# Patient Record
Sex: FEMALE | Race: WHITE | URBAN - METROPOLITAN AREA
[De-identification: names, ages, dates, MRNs, and addresses within clinical notes are randomized per-mention and may not be internally consistent; named-entity substitution may affect disease eponyms.]

---

## 2024-05-08 ENCOUNTER — TRANSCRIBE ORDERS (OUTPATIENT)
Dept: ADMINISTRATIVE | Facility: HOSPITAL | Age: 84
End: 2024-05-08
Payer: MEDICARE

## 2024-05-08 DIAGNOSIS — R60.0 LOWER EXTREMITY EDEMA: Primary | ICD-10-CM

## 2024-06-05 ENCOUNTER — HOSPITAL ENCOUNTER (OUTPATIENT)
Dept: CARDIOLOGY | Facility: HOSPITAL | Age: 84
Discharge: HOME | End: 2024-06-05
Payer: MEDICARE

## 2024-06-05 VITALS
SYSTOLIC BLOOD PRESSURE: 127 MMHG | DIASTOLIC BLOOD PRESSURE: 80 MMHG | HEIGHT: 67 IN | WEIGHT: 170 LBS | BODY MASS INDEX: 26.68 KG/M2

## 2024-06-05 DIAGNOSIS — R60.0 LOWER EXTREMITY EDEMA: ICD-10-CM

## 2024-06-05 PROCEDURE — 93306 TTE W/DOPPLER COMPLETE: CPT

## 2024-06-05 PROCEDURE — 93356 MYOCRD STRAIN IMG SPCKL TRCK: CPT

## 2024-06-06 LAB
AORTIC DIMENSIONLESS INDEX: 0.85 (DI)
BH CV ECHO LEFT VENTRICLE GLOBAL LONGITUDINAL STRAIN: -16 %
BH CV ECHO MEAS - ACS: 1.9 CM
BH CV ECHO MEAS - AO MAX PG: 6.7 MMHG
BH CV ECHO MEAS - AO MEAN PG: 4 MMHG
BH CV ECHO MEAS - AO V2 MAX: 129 CM/SEC
BH CV ECHO MEAS - AO V2 VTI: 27.6 CM
BH CV ECHO MEAS - AVA(I,D): 2.7 CM2
BH CV ECHO MEAS - EDV(CUBED): 50.7 ML
BH CV ECHO MEAS - EDV(MOD-SP4): 50.9 ML
BH CV ECHO MEAS - EF(MOD-BP): 65 %
BH CV ECHO MEAS - EF(MOD-SP4): 64.8 %
BH CV ECHO MEAS - ESV(CUBED): 13.8 ML
BH CV ECHO MEAS - ESV(MOD-SP4): 17.9 ML
BH CV ECHO MEAS - FS: 35.1 %
BH CV ECHO MEAS - IVS/LVPW: 1.11 CM
BH CV ECHO MEAS - IVSD: 1 CM
BH CV ECHO MEAS - LA DIMENSION: 3.3 CM
BH CV ECHO MEAS - LAT PEAK E' VEL: 9.5 CM/SEC
BH CV ECHO MEAS - LV DIASTOLIC VOL/BSA (35-75): 27 CM2
BH CV ECHO MEAS - LV MASS(C)D: 104.6 GRAMS
BH CV ECHO MEAS - LV MAX PG: 4.7 MMHG
BH CV ECHO MEAS - LV MEAN PG: 3 MMHG
BH CV ECHO MEAS - LV SYSTOLIC VOL/BSA (12-30): 9.5 CM2
BH CV ECHO MEAS - LV V1 MAX: 108 CM/SEC
BH CV ECHO MEAS - LV V1 VTI: 24 CM
BH CV ECHO MEAS - LVIDD: 3.7 CM
BH CV ECHO MEAS - LVIDS: 2.4 CM
BH CV ECHO MEAS - LVOT AREA: 3.1 CM2
BH CV ECHO MEAS - LVOT DIAM: 2 CM
BH CV ECHO MEAS - LVPWD: 0.9 CM
BH CV ECHO MEAS - MED PEAK E' VEL: 6.1 CM/SEC
BH CV ECHO MEAS - MR MAX PG: 96.8 MMHG
BH CV ECHO MEAS - MR MAX VEL: 492 CM/SEC
BH CV ECHO MEAS - MV A MAX VEL: 90.2 CM/SEC
BH CV ECHO MEAS - MV DEC SLOPE: 401 CM/SEC2
BH CV ECHO MEAS - MV DEC TIME: 0.21 SEC
BH CV ECHO MEAS - MV E MAX VEL: 105 CM/SEC
BH CV ECHO MEAS - MV E/A: 1.16
BH CV ECHO MEAS - MV MAX PG: 3.9 MMHG
BH CV ECHO MEAS - MV MEAN PG: 2 MMHG
BH CV ECHO MEAS - MV P1/2T: 72.7 MSEC
BH CV ECHO MEAS - MV V2 VTI: 28.9 CM
BH CV ECHO MEAS - MVA(P1/2T): 3 CM2
BH CV ECHO MEAS - MVA(VTI): 2.6 CM2
BH CV ECHO MEAS - PA ACC TIME: 0.14 SEC
BH CV ECHO MEAS - PA V2 MAX: 78.4 CM/SEC
BH CV ECHO MEAS - PI END-D VEL: 120 CM/SEC
BH CV ECHO MEAS - PULM A REVS DUR: 0.11 SEC
BH CV ECHO MEAS - PULM A REVS VEL: 30.2 CM/SEC
BH CV ECHO MEAS - PULM DIAS VEL: 32.1 CM/SEC
BH CV ECHO MEAS - PULM S/D: 1.68
BH CV ECHO MEAS - PULM SYS VEL: 53.9 CM/SEC
BH CV ECHO MEAS - RV MAX PG: 1.47 MMHG
BH CV ECHO MEAS - RV V1 MAX: 60.7 CM/SEC
BH CV ECHO MEAS - RV V1 VTI: 11.6 CM
BH CV ECHO MEAS - RVDD: 3.3 CM
BH CV ECHO MEAS - SV(LVOT): 75.4 ML
BH CV ECHO MEAS - SV(MOD-SP4): 33 ML
BH CV ECHO MEAS - SVI(LVOT): 40 ML/M2
BH CV ECHO MEAS - SVI(MOD-SP4): 17.5 ML/M2
BH CV ECHO MEAS - TAPSE (>1.6): 1.83 CM
BH CV ECHO MEAS - TR MAX PG: 31.8 MMHG
BH CV ECHO MEAS - TR MAX VEL: 282 CM/SEC
BH CV ECHO MEASUREMENTS AVERAGE E/E' RATIO: 13.46
BH CV XLRA - TDI S': 11.1 CM/SEC
LEFT ATRIUM VOLUME INDEX: 15.1 ML/M2
SINUS: 3.1 CM

## 2024-08-23 ENCOUNTER — OFFICE VISIT (OUTPATIENT)
Age: 84
End: 2024-08-23
Payer: MEDICARE

## 2024-08-23 VITALS
DIASTOLIC BLOOD PRESSURE: 80 MMHG | HEIGHT: 67 IN | WEIGHT: 177 LBS | HEART RATE: 81 BPM | OXYGEN SATURATION: 97 % | SYSTOLIC BLOOD PRESSURE: 132 MMHG | BODY MASS INDEX: 27.78 KG/M2

## 2024-08-23 DIAGNOSIS — R06.09 DOE (DYSPNEA ON EXERTION): ICD-10-CM

## 2024-08-23 DIAGNOSIS — I34.0 MITRAL VALVE INSUFFICIENCY, UNSPECIFIED ETIOLOGY: Primary | ICD-10-CM

## 2024-08-23 PROCEDURE — 93000 ELECTROCARDIOGRAM COMPLETE: CPT | Performed by: STUDENT IN AN ORGANIZED HEALTH CARE EDUCATION/TRAINING PROGRAM

## 2024-08-23 PROCEDURE — 99204 OFFICE O/P NEW MOD 45 MIN: CPT | Performed by: STUDENT IN AN ORGANIZED HEALTH CARE EDUCATION/TRAINING PROGRAM

## 2024-08-23 PROCEDURE — 1160F RVW MEDS BY RX/DR IN RCRD: CPT | Performed by: STUDENT IN AN ORGANIZED HEALTH CARE EDUCATION/TRAINING PROGRAM

## 2024-08-23 PROCEDURE — 1159F MED LIST DOCD IN RCRD: CPT | Performed by: STUDENT IN AN ORGANIZED HEALTH CARE EDUCATION/TRAINING PROGRAM

## 2024-08-23 RX ORDER — LEVOTHYROXINE SODIUM 0.07 MG/1
75 TABLET ORAL DAILY
COMMUNITY

## 2024-08-23 RX ORDER — LORATADINE 10 MG/1
CAPSULE, LIQUID FILLED ORAL
COMMUNITY

## 2024-08-23 RX ORDER — MELOXICAM 15 MG/1
15 TABLET ORAL DAILY
COMMUNITY

## 2024-08-23 RX ORDER — BETAMETHASONE DIPROPIONATE 0.5 MG/G
CREAM TOPICAL
COMMUNITY
Start: 2024-07-23

## 2024-08-23 RX ORDER — OMEPRAZOLE 20 MG/1
20 CAPSULE, DELAYED RELEASE ORAL DAILY
COMMUNITY
Start: 2024-05-07

## 2024-08-23 RX ORDER — ASPIRIN 81 MG/1
1 TABLET ORAL DAILY
COMMUNITY

## 2024-08-23 RX ORDER — PAROXETINE HCL 20 MG
TABLET ORAL
COMMUNITY

## 2024-08-23 NOTE — PROGRESS NOTES
Subjective:     Encounter Date:08/23/2024      Patient ID: Yuliet Joiner is a 84 y.o. female.    Chief Complaint:  Regurgitation    HPI:   84 y.o. female with hypothyroidism, hyperlipidemia, GERD who presents for initial evaluation of mitral regurgitation. Patient underwent echocardiogram and June 2024 presumably for evaluation of lower extremity edema.  This revealed an EF of 65% and was concerning for mitral regurgitation.  Some views the mitral rotation appears to be mild however there is 1 four-chamber view where it appears to be more eccentric and possibly moderate to severe.  She has been referred here for further evaluation.  She ports chronic lower extremity edema as well as mild dyspnea on exertion.  She can experience dyspnea on exertion when bringing her groceries into her home.      The following portions of the patient's history were reviewed and updated as appropriate: allergies, current medications, past family history, past medical history, past social history, past surgical history and problem list.     REVIEW OF SYSTEMS:   All systems reviewed.  Pertinent positives identified in HPI.  All other systems are negative.    Past Medical History:   Diagnosis Date    Gastroesophageal reflux     Hyperlipidemia     Hyperthyroidism     Peripheral neuropathy     Skin cancer        Family History   Problem Relation Age of Onset    Cancer Mother     Heart failure Father        Social History     Socioeconomic History    Marital status: Unknown   Tobacco Use    Smoking status: Never     Passive exposure: Never    Smokeless tobacco: Never   Vaping Use    Vaping status: Never Used   Substance and Sexual Activity    Alcohol use: Never    Drug use: Never       No Known Allergies    Past Surgical History:   Procedure Laterality Date    HYSTERECTOMY      KNEE SURGERY           ECG 12 Lead    Date/Time: 8/23/2024 12:27 PM  Performed by: Enrique Mckeon MD    Authorized by: Enrique Mckeon MD  Comparison: not compared with  previous ECG   Previous ECG: no previous ECG available  Rhythm: sinus rhythm  Rate: normal  Conduction: conduction normal  QRS axis: normal    Clinical impression: normal ECG             Objective:         Vitals:    08/23/24 0957   BP: 132/80   Pulse: 81   SpO2: 97%       PHYSICAL EXAM:  GEN: well appearing, in NAD   HEENT: NCAT, EOMI, moist mucus membranes   Respiratory: CTAB, no wheezes, rales or rhonchi  CV: normal rate, regular rhythm, normal S1, S2, no murmurs, rubs, gallops, +2 radial pulses b/l  GI: soft, nontender, nondistended  MSK: no edema  Skin: no rash, warm, dry  Heme/Lymph: no bruising or bleeding  Neuro: Alert and Oriented x 3, grossly normal motor function        Assessment:         (I34.0) Mitral valve insufficiency, unspecified etiology - Plan: Adult Transesophageal Echo (SAE) W/ Cont if Necessary Per Protocol    (R06.09) DOWNS (dyspnea on exertion) - Plan: Adult Transesophageal Echo (SAE) W/ Cont if Necessary Per Protocol    84 y.o. female with hypothyroidism, hyperlipidemia, GERD who presents for initial evaluation of mitral regurgitation.       Plan:       #Dyspnea on exertion/possible moderate mitral regurgitation  Patient with mild dyspnea exertion lower extremity edema.  Echocardiogram with EF of 65% but with possibly moderate to severe MR.  This was only seen on 1 four-chamber view.  Discussed further evaluation for mitral rotation with a SAE.  If SAE reveals mild MR, may consider stress test as further evaluation for dyspnea.  If SAE confirms severe MR, given her symptoms, she would likely need surgical intervention and she may need left heart catheterization with coronary angiogram at that time.  Patient does state however that she would not be agreeable to surgery  - SAE    Gurwinder Stacy MD, thank you very much for referring this kind patient to me. Please call me with any questions or concerns. I will see the patient again in the office pending test results         Enrique Jordan MD, FACC,  FSCAI  08/23/24  [facility]    Outpatient Encounter Medications as of 8/23/2024   Medication Sig Dispense Refill    ascorbic acid (VITAMIN C) 1000 MG tablet Take 0.5 tablets by mouth Daily.      aspirin 81 MG EC tablet Take 1 tablet by mouth Daily.      betamethasone dipropionate (DIPROSONE) 0.05 % cream APPLY TOPICALLY TO RASH ON FINGERS TWICE DAILY      CRANBERRY PO Take 1 tablet by mouth Daily.      levothyroxine (SYNTHROID, LEVOTHROID) 75 MCG tablet Take 1 tablet by mouth Daily.      Loratadine 10 MG capsule Take  by mouth.      meloxicam (MOBIC) 15 MG tablet Take 1 tablet by mouth Daily.      omeprazole (priLOSEC) 20 MG capsule Take 1 capsule by mouth Daily.      Paxil 20 MG tablet        No facility-administered encounter medications on file as of 8/23/2024.

## 2024-09-20 ENCOUNTER — TELEPHONE (OUTPATIENT)
Dept: CARDIOLOGY | Facility: CLINIC | Age: 84
End: 2024-09-20
Payer: MEDICARE

## 2024-10-24 ENCOUNTER — HOSPITAL ENCOUNTER (EMERGENCY)
Facility: HOSPITAL | Age: 84
Discharge: HOME | End: 2024-10-24
Attending: EMERGENCY MEDICINE
Payer: MEDICARE

## 2024-10-24 VITALS
BODY MASS INDEX: 27.47 KG/M2 | RESPIRATION RATE: 18 BRPM | HEIGHT: 67 IN | WEIGHT: 175 LBS | OXYGEN SATURATION: 97 % | DIASTOLIC BLOOD PRESSURE: 87 MMHG | TEMPERATURE: 98.1 F | SYSTOLIC BLOOD PRESSURE: 153 MMHG | HEART RATE: 102 BPM

## 2024-10-24 DIAGNOSIS — U07.1 COVID-19: Primary | ICD-10-CM

## 2024-10-24 LAB
ANION GAP SERPL CALCULATED.3IONS-SCNC: 8.5 MMOL/L (ref 5–15)
BUN SERPL-MCNC: 18 MG/DL (ref 8–23)
BUN/CREAT SERPL: 23.7 (ref 7–25)
CALCIUM SPEC-SCNC: 9.7 MG/DL (ref 8.6–10.5)
CHLORIDE SERPL-SCNC: 94 MMOL/L (ref 98–107)
CO2 SERPL-SCNC: 26.5 MMOL/L (ref 22–29)
CREAT SERPL-MCNC: 0.76 MG/DL (ref 0.57–1)
EGFRCR SERPLBLD CKD-EPI 2021: 77.4 ML/MIN/1.73
FLUAV SUBTYP SPEC NAA+PROBE: NOT DETECTED
FLUBV RNA ISLT QL NAA+PROBE: NOT DETECTED
GLUCOSE SERPL-MCNC: 109 MG/DL (ref 65–99)
POTASSIUM SERPL-SCNC: 4.2 MMOL/L (ref 3.5–5.2)
SARS-COV-2 RNA RESP QL NAA+PROBE: DETECTED
SODIUM SERPL-SCNC: 129 MMOL/L (ref 136–145)

## 2024-10-24 PROCEDURE — 87636 SARSCOV2 & INF A&B AMP PRB: CPT

## 2024-10-24 PROCEDURE — 99283 EMERGENCY DEPT VISIT LOW MDM: CPT | Performed by: EMERGENCY MEDICINE

## 2024-10-24 PROCEDURE — 99283 EMERGENCY DEPT VISIT LOW MDM: CPT

## 2024-10-24 PROCEDURE — 80048 BASIC METABOLIC PNL TOTAL CA: CPT | Performed by: EMERGENCY MEDICINE

## 2024-10-24 PROCEDURE — 36415 COLL VENOUS BLD VENIPUNCTURE: CPT

## 2024-10-24 NOTE — DISCHARGE INSTRUCTIONS
"You have recently been diagnosed with coronavirus disease 2019 (COVID-19). COVID-19 causes an infection in your lungs and may cause problems with other organs, including the kidneys, heart, and liver. Most often it causes a respiratory illness with fever, coughing, and shortness of breath. You may have mild to moderate symptoms or severe illness.    This article is about how to recover from mild-to-moderate COVID-19 that does not require hospital treatment. People with severe illness will usually be treated in the hospital.    Recovery from COVID-19 may take 10 to 14 days or longer depending on your symptoms. Some people have symptoms that go on for months even after they are no longer infected or able to spread the disease to other people. This is called long COVID.    oronavirus - 2019 discharge; SARS-CoV-2 discharge; COVID-19 recovery; Coronavirus disease - recovery; Recovering from COVID-19    What to Expect at Home  You tested positive for COVID-19 and are well enough to recover at home. While at home, try to separate yourself and stay away from other people both inside and outside your home. This can help protect others from getting the virus.    As much as possible, stay in a specific room and away from others in your home. Use a separate bathroom if you can. Do not leave your home except to get medical care.  Use a well-fitting face mask or respirator that fits well over your nose and mouth without gaps when you see your health care provider and anytime other people are in the same room with you.  Take steps to improve ventilation in your home. You can do this by turning on exhaust fans in your bathrooms and kitchen, using a portable air , and setting the fan on your furnace or air conditioning to \"on\" if you have central heating and cooling in your home.  Wash your hands many times a day with soap and running water for at least 20 seconds. If soap and water are not easily available, you should use an " alcohol-based hand  that contains at least 60% alcohol.  Do not share personal items such as cups, eating utensils, towels, or bedding. Wash anything you have used in soap and water.  MEDICINES    Your provider may prescribe antiviral medicines to help in your recovery, such as Paxlovid. Be sure to take your medicine as prescribed. Do not miss any doses.    TAKE CARE OF YOURSELF    It's important to manage your symptoms, get proper nutrition, and stay active as much as you can as you recover at home.    Managing COVID-19 symptoms    While recovering at home, keep track of your symptoms. Follow your provider's instructions and take medicines as prescribed. If you have severe symptoms, call 911 or the local emergency number.    To help manage symptoms of COVID-19, try the following tips.    Rest and drink plenty of fluids.  Acetaminophen (Tylenol) and ibuprofen (Advil, Motrin) help reduce fever. Sometimes, providers advise you to use both types of medicine. Take the recommended amount to reduce fever. DO NOT use ibuprofen in children 6 months or younger.  A lukewarm bath or sponge bath may help cool a fever. Keep taking medicine -- otherwise your temperature might go back up.  For a sore throat, gargle several times a day with warm salt water (1/2 tsp or 3 grams of salt in 1 cup or 240 milliliters of water). Drink warm liquids such as tea, or lemon tea with honey. Suck on hard candies or throat lozenges.  Use a vaporizer or take a steamy shower to increase moisture in the air, reduce nasal congestion, and help soothe a dry throat and cough.  Saline spray can also help reduce nasal congestion.  To help relieve diarrhea, drink 8 to 10 glasses of clear liquids, such as water, diluted fruit juices, and clear soups to make up for fluid loss. Avoid dairy products, fried foods, caffeine, alcohol, and carbonated drinks.  If you have nausea, eat small meals with bland foods. Avoid foods with strong smells. Try to  drink 8 to 10 glasses of water or clear fluids every day to stay hydrated.  Do not smoke, and stay away from secondhand smoke.  Nutrition    COVID-19 symptoms such as loss of taste and smell, nausea, or tiredness can make it hard to want to eat. But eating a healthy diet is important for your recovery. These suggestions may help:    Try to eat healthy foods you enjoy most of the time. Eat anytime you feel like eating, not just at mealtime.  Include a variety of fruits, vegetables, whole grains, dairy, and protein foods. Include a protein food with every meal (tofu, beans, legumes, cheese, fish, poultry, or lean meat)  Try adding herbs, spices, onion, garlic, mary, hot sauce or spice, mustard, vinegar, pickles, and other strong flavors to help increase enjoyment.  Try foods with different textures (soft or crunchy) and temperatures (cool or warm) to see what is more appealing.  Eat smaller meals more often throughout the day.  Don't fill up on liquids before or during your meals.  Physical Activity    Even though you don't have a lot of energy, it's important to move your body every day. This will help you regain your strength.    Deep breathing exercises may increase the amount of oxygen in your lungs and help open up airways. Ask your provider to show you.  Simple stretching exercises keep your body from getting stiff. Try to sit upright as much as you can during the day.  Try walking around your home for short periods every day. Try to do 5 minutes, 5 times a day. Slowly build up every week.  WHEN TO RESUME NORMAL ACTIVITIES    Once you start feeling better, you can go back to your normal activities if both of the following things are true:    For at least 24 hours, you have been feeling better and your symptoms are improving AND  You have not had a fever for at least 24 hours, and you are not using fever-reducing medicine  Even though you feel better, you may still be able to spread the virus to others for  several days. For this reason, once you go back to your normal activities, continue to protect others from illness by taking these steps for 5 days:    Practice good hygiene  Take steps for  air  Wear a mask around others  Practice physical distancing by avoiding close contact and avoiding crowds  Self-test before being indoors with others  If your symptoms or fever return after resuming normal activities, you should go back to staying home and away from others. Once your fever and symptoms improve for more than 24 hours, you can resume activities while taking steps to protect others for 5 more days.    When to Call the Doctor  You should contact your provider if your symptoms are getting worse.    Call 911 or your local emergency number if you have:    Trouble breathing  Chest pain or pressure  Confusion or inability to wake up  Bluish, gray, or pale discoloration of lips or face  Seizures  Slurred speech  Weakness or numbness in a limb or one side of the face  Swelling of the legs or arms  Any other symptoms that are severe or concern you

## 2024-10-24 NOTE — FSED PROVIDER NOTE
Subjective   History of Present Illness  84 yof complains of a sore throat and a mild cough. The patient reports she has had symptoms for the past few days. She was recently exposed to COVID. She denies shortness of breath, chest pain, dizziness or vomiting.         Review of Systems   Constitutional: Negative.    HENT:  Positive for congestion, sinus pain and sore throat.    Respiratory:  Positive for cough.    All other systems reviewed and are negative.      Past Medical History:   Diagnosis Date    Gastroesophageal reflux     Hyperlipidemia     Hyperthyroidism     Peripheral neuropathy     Skin cancer        No Known Allergies    Past Surgical History:   Procedure Laterality Date    HYSTERECTOMY      KNEE SURGERY         Family History   Problem Relation Age of Onset    Cancer Mother     Heart failure Father        Social History     Socioeconomic History    Marital status: Unknown   Tobacco Use    Smoking status: Never     Passive exposure: Never    Smokeless tobacco: Never   Vaping Use    Vaping status: Never Used   Substance and Sexual Activity    Alcohol use: Never    Drug use: Never           Objective   Physical Exam  Vitals reviewed.   Constitutional:       General: She is not in acute distress.     Appearance: She is not ill-appearing.   HENT:      Head: Normocephalic and atraumatic.      Nose: Nose normal.      Mouth/Throat:      Mouth: Mucous membranes are moist.      Pharynx: Oropharynx is clear.   Eyes:      Extraocular Movements: Extraocular movements intact.      Pupils: Pupils are equal, round, and reactive to light.   Cardiovascular:      Rate and Rhythm: Normal rate and regular rhythm.      Pulses: Normal pulses.   Pulmonary:      Effort: Pulmonary effort is normal.      Breath sounds: Normal breath sounds.   Musculoskeletal:      Cervical back: Normal range of motion and neck supple.   Skin:     General: Skin is warm and dry.   Neurological:      General: No focal deficit present.      Mental  Status: She is alert.         Procedures           ED Course                                           Medical Decision Making  84 yof complains of sore throat, congestion and cough. The patient was recently exposed to COVID. Pt was found to be COVID positive. Pt requested Paxlovid prescription. Discussed with patient risk vs benefit. Went over medication list and precautions. Strict return precautions discussed.     Problems Addressed:  COVID-19: complicated acute illness or injury    Risk  Prescription drug management.        Final diagnoses:   COVID-19       ED Disposition  ED Disposition       ED Disposition   Discharge    Condition   Stable    Comment   --               Gurwinder Stacy MD  [address] [place] [address] [place] [address] [phone]    Schedule an appointment as soon as possible for a visit on 10/28/2024           Medication List        New Prescriptions      Nirmatrelvir & Ritonavir (300mg/100mg)  Commonly known as: PAXLOVID  Take 3 tablets by mouth 2 (Two) Times a Day for 5 days. Indications: COVID-19 Confirmed Infection               Where to Get Your Medications        These medications were sent to [id number] - [facility] [place] - [address] AT [place] [address] & [address] - [phone] [facility] [phone]   [address] [place] [address]      Phone: [phone]   Nirmatrelvir & Ritonavir (300mg/100mg)

## 2025-05-06 ENCOUNTER — OFFICE VISIT (OUTPATIENT)
Age: 85
End: 2025-05-06
Payer: MEDICARE

## 2025-05-06 VITALS
WEIGHT: 172 LBS | OXYGEN SATURATION: 97 % | BODY MASS INDEX: 27 KG/M2 | SYSTOLIC BLOOD PRESSURE: 122 MMHG | HEIGHT: 67 IN | DIASTOLIC BLOOD PRESSURE: 70 MMHG | HEART RATE: 87 BPM

## 2025-05-06 DIAGNOSIS — R06.09 DOE (DYSPNEA ON EXERTION): Primary | ICD-10-CM

## 2025-05-06 DIAGNOSIS — I34.0 NONRHEUMATIC MITRAL VALVE REGURGITATION: ICD-10-CM

## 2025-05-06 PROCEDURE — 1160F RVW MEDS BY RX/DR IN RCRD: CPT | Performed by: STUDENT IN AN ORGANIZED HEALTH CARE EDUCATION/TRAINING PROGRAM

## 2025-05-06 PROCEDURE — 99214 OFFICE O/P EST MOD 30 MIN: CPT | Performed by: STUDENT IN AN ORGANIZED HEALTH CARE EDUCATION/TRAINING PROGRAM

## 2025-05-06 PROCEDURE — 1159F MED LIST DOCD IN RCRD: CPT | Performed by: STUDENT IN AN ORGANIZED HEALTH CARE EDUCATION/TRAINING PROGRAM

## 2025-05-06 NOTE — PROGRESS NOTES
Subjective:     Encounter Date:05/06/2025      Patient ID: Yuliet Joiner is a 84 y.o. female.    Chief Complaint:  Mitral regurgitation, DOWNS    HPI:   84 y.o. female with hypothyroidism, hyperlipidemia, GERD, moderate to severe mitral regurgitation who presents for follow-up. I initially saw the patient in August 2024 for mild dyspnea exertion, lower extremity edema and moderate to severe MR. We recommended SAE at that time but patient decided against this. She presents today for follow-up.  She notes that her symptoms of dyspnea exertion remain relatively unchanged.  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  She notes that this is the reason why she declined the SAE as she had no one to care for him.  She states that he can do work around her house and be asymptomatic however if she has to go up a flight of stairs or exert herself with groceries, she gets experiences dyspnea.  She states that her dyspnea is unchanged compared to last year.    The following portions of the patient's history were reviewed and updated as appropriate: allergies, current medications, past family history, past medical history, past social history, past surgical history and problem list.     REVIEW OF SYSTEMS:   All systems reviewed.  Pertinent positives identified in HPI.  All other systems are negative.    Past Medical History:   Diagnosis Date    Gastroesophageal reflux     Hyperlipidemia     Hyperthyroidism     Peripheral neuropathy     Skin cancer        Family History   Problem Relation Age of Onset    Cancer Mother     Heart failure Father        Social History     Socioeconomic History    Marital status: Unknown   Tobacco Use    Smoking status: Never     Passive exposure: Never    Smokeless tobacco: Never   Vaping Use    Vaping status: Never Used   Substance and Sexual Activity    Alcohol use: Never    Drug use: Never       Allergies   Allergen Reactions    Krill Oil Unknown - Low  Severity       Past Surgical History:   Procedure Laterality Date    HYSTERECTOMY      KNEE SURGERY         Procedures       Objective:         Vitals:    05/06/25 1039   BP: 122/70   Pulse: 87   SpO2: 97%       PHYSICAL EXAM:  GEN: well appearing, in NAD   HEENT: NCAT, EOMI, moist mucus membranes   Respiratory: CTAB, no wheezes, rales or rhonchi  CV: normal rate, regular rhythm, normal S1, S2, no murmurs, rubs, gallops, +2 radial pulses b/l  GI: soft, nontender, nondistended  MSK: no edema  Skin: no rash, warm, dry  Heme/Lymph: no bruising or bleeding  Neuro: Alert and Oriented x 3, grossly normal motor function        Assessment:         (R06.09) DOWNS (dyspnea on exertion) - Plan: Stress Test With Myocardial Perfusion One Day    84 y.o. female with hypothyroidism, hyperlipidemia, GERD who presents for initial evaluation of mitral regurgitation.       Plan:       #Dyspnea on exertion  Patient with mild dyspnea exertion lower extremity edema.  Echocardiogram with EF of 65% but with possibly moderate to severe MR.  This was only seen on 1 four-chamber view.  Discussed possible SAE last year however she declined.  She was interested in a stress test today which I think is reasonable given her risk factors and her symptoms.  We did briefly discuss possible need for coronary angiography which she was agreeable to.  - Exercise nuclear stress test, pending results will decide on need for further testing.  May consider repeat echocardiogram for surveillance of her mitral regurgitation    Gurwinder Stacy MD, thank you very much for referring this kind patient to me. Please call me with any questions or concerns. I will see the patient again in the office pending test results         Enrique Jordan MD, [facility], [facility]  05/06/25  [facility]    Outpatient Encounter Medications as of 5/6/2025   Medication Sig Dispense Refill    ascorbic acid (VITAMIN C) 1000 MG tablet Take 0.5 tablets by mouth Daily.      CRANBERRY PO Take  1 tablet by mouth Daily.      levothyroxine (SYNTHROID, LEVOTHROID) 75 MCG tablet Take 1 tablet by mouth Daily.      Loratadine 10 MG capsule Take  by mouth.      meloxicam (MOBIC) 15 MG tablet Take 1 tablet by mouth Daily.      omeprazole (priLOSEC) 20 MG capsule Take 1 capsule by mouth Daily.      Paxil 20 MG tablet 1 tablet.      [DISCONTINUED] aspirin 81 MG EC tablet Take 1 tablet by mouth Daily.      [DISCONTINUED] betamethasone dipropionate (DIPROSONE) 0.05 % cream APPLY TOPICALLY TO RASH ON FINGERS TWICE DAILY       No facility-administered encounter medications on file as of 5/6/2025.

## 2025-05-13 ENCOUNTER — TELEPHONE (OUTPATIENT)
Dept: CARDIOLOGY | Age: 85
End: 2025-05-13
Payer: MEDICARE

## 2025-05-14 ENCOUNTER — HOSPITAL ENCOUNTER (OUTPATIENT)
Dept: CARDIOLOGY | Facility: HOSPITAL | Age: 85
Discharge: HOME | End: 2025-05-14
Admitting: STUDENT IN AN ORGANIZED HEALTH CARE EDUCATION/TRAINING PROGRAM
Payer: MEDICARE

## 2025-05-14 VITALS — BODY MASS INDEX: 26.99 KG/M2 | WEIGHT: 171.96 LBS | HEIGHT: 67 IN

## 2025-05-14 DIAGNOSIS — R06.09 DOE (DYSPNEA ON EXERTION): ICD-10-CM

## 2025-05-14 LAB
BH CV NUCLEAR PRIOR STUDY: 2
BH CV REST NUCLEAR ISOTOPE DOSE: 10 MCI
BH CV STRESS BP STAGE 1: NORMAL
BH CV STRESS DURATION MIN STAGE 1: 6
BH CV STRESS DURATION SEC STAGE 1: 0
BH CV STRESS GRADE STAGE 1: 10
BH CV STRESS HR STAGE 1: 138
BH CV STRESS METS STAGE 1: 5
BH CV STRESS NUCLEAR ISOTOPE DOSE: 34.7 MCI
BH CV STRESS PROTOCOL 1: NORMAL
BH CV STRESS RECOVERY BP: NORMAL MMHG
BH CV STRESS RECOVERY HR: 98 BPM
BH CV STRESS SPEED STAGE 1: 1.7
BH CV STRESS STAGE 1: 1
MAXIMAL PREDICTED HEART RATE: 135 BPM
PERCENT MAX PREDICTED HR: 102.22 %
SPECT HRT GATED+EF W RNC IV: 77 %
STRESS BASELINE BP: NORMAL MMHG
STRESS BASELINE HR: 88 BPM
STRESS PERCENT HR: 120 %
STRESS POST ESTIMATED WORKLOAD: 5 METS
STRESS POST EXERCISE DUR MIN: 6 MIN
STRESS POST EXERCISE DUR SEC: 0 SEC
STRESS POST PEAK BP: NORMAL MMHG
STRESS POST PEAK HR: 138 BPM
STRESS TARGET HR: 115 BPM

## 2025-05-14 PROCEDURE — 34310000005 TECHNETIUM TETROFOSMIN KIT: Performed by: STUDENT IN AN ORGANIZED HEALTH CARE EDUCATION/TRAINING PROGRAM

## 2025-05-14 PROCEDURE — 93017 CV STRESS TEST TRACING ONLY: CPT

## 2025-05-14 PROCEDURE — A9502 TC99M TETROFOSMIN: HCPCS | Performed by: STUDENT IN AN ORGANIZED HEALTH CARE EDUCATION/TRAINING PROGRAM

## 2025-05-14 PROCEDURE — 78452 HT MUSCLE IMAGE SPECT MULT: CPT

## 2025-05-14 RX ADMIN — TETROFOSMIN 1 DOSE: 1.38 INJECTION, POWDER, LYOPHILIZED, FOR SOLUTION INTRAVENOUS at 09:45

## 2025-05-14 RX ADMIN — TETROFOSMIN 1 DOSE: 1.38 INJECTION, POWDER, LYOPHILIZED, FOR SOLUTION INTRAVENOUS at 10:43
